# Patient Record
(demographics unavailable — no encounter records)

---

## 2024-12-11 NOTE — HISTORY OF PRESENT ILLNESS
[FreeTextEntry1] : 22 y/o female referred to rheumatology for positive ANUEL. Patient had labwork done by PCP as part of regular follow up. Patient denies any concerning symptoms including fatigue, joint pains, swelling/redness/warmth, oral ulcers, fever, Raynaud's, hair loss, cough, SOB, diarrhea, blood in stool, sicca symptoms. Denies family Hx of autoimmune diseases.  WORKUP: Remarkable for (11/2024): ANUEL 1:80, Vit D 25-OH 13.7 Normal/neg (11/2024): CBC, CMP, U/A, TSH

## 2024-12-11 NOTE — ASSESSMENT
[FreeTextEntry1] : 20 y/o female referred to rheumatology for positive ANUEL. Patient had labwork done by PCP as part of regular follow up. Patient denies any concerning symptoms including fatigue, joint pains, swelling/redness/warmth, oral ulcers, fever, Raynaud's, hair loss, cough, SOB, diarrhea, blood in stool, sicca symptoms. Denies family Hx of autoimmune diseases.  Patient does not have signs of underlying connective tissue diseases (CTDs) including inflammatory joint pain, malar rash, photosensitivity, sicca symptoms, Raynauds. Based on existing labs, patient does not have hemolytic anemia, leukopenia, thrombocytopenia, kidney disease. Exam without synovitis, rashes, changes of Raynauds. There is low suspicion for underlying CTD.  ANUEL is a marker that is sensitive but not specific for underlying rheumatologic diseases such as lupus. The most common causes of positive ANUEL in patients without underlying rheumatologic diseases are infections, malignancies, and other autoimmune diseases such as Hashimotos. Up to 30% of patients without any underlying disease can have positive ANUEL.  Given no symptoms or signs of autoimmune diseases, there is no concern for systemic autoimmune diseases and patient does not need workup for borderline ANUEL. Workup for abnormal antibodies without symptoms can lead to unnecessary workup, false diagnoses, and inappropriate treatment and can cause harm. I do not recommend obtaining or trending ANUEL without any new concerning symptoms. Discussed with pt the symptoms to watch for any systemic autoimmune diseases and advised she can return for any concerns.  RTC PRN.